# Patient Record
Sex: MALE | Race: WHITE
[De-identification: names, ages, dates, MRNs, and addresses within clinical notes are randomized per-mention and may not be internally consistent; named-entity substitution may affect disease eponyms.]

---

## 2023-04-21 ENCOUNTER — HOSPITAL ENCOUNTER (OUTPATIENT)
Age: 43
Discharge: HOME | End: 2023-04-21
Payer: COMMERCIAL

## 2023-04-21 DIAGNOSIS — K90.0: Primary | ICD-10-CM

## 2023-04-21 DIAGNOSIS — Z87.19: ICD-10-CM

## 2023-04-21 LAB
ALANINE AMINOTRANSFER ALT/SGPT: 29 U/L (ref 16–61)
ALBUMIN SERPL-MCNC: 4 G/DL (ref 3.2–5)
ALKALINE PHOSPHATASE: 49 U/L (ref 45–117)
ANION GAP: 3 (ref 5–15)
AST(SGOT): 13 U/L (ref 15–37)
BUN SERPL-MCNC: 21 MG/DL (ref 7–18)
BUN/CREAT RATIO: 19.6 RATIO (ref 10–20)
CALCIUM SERPL-MCNC: 9 MG/DL (ref 8.5–10.1)
CARBON DIOXIDE: 27 MMOL/L (ref 21–32)
CHLORIDE: 108 MMOL/L (ref 98–107)
CRP SERPL-MCNC: < 2.9 MG/L (ref 0–3)
DEPRECATED RDW RBC: 38.6 FL (ref 35.1–43.9)
ERYTHROCYTE [DISTWIDTH] IN BLOOD: 12.1 % (ref 11.6–14.6)
EST GLOM FILT RATE - AFR AMER: 98 ML/MIN (ref 60–?)
GLOBULIN: 3.1 G/DL (ref 2.2–4.2)
GLUCOSE: 94 MG/DL (ref 74–106)
HCT VFR BLD AUTO: 41 % (ref 40–54)
HEMOGLOBIN: 13.8 G/DL (ref 13–16.5)
HGB BLD-MCNC: 13.8 G/DL (ref 13–16.5)
IMMATURE GRANULOCYTES COUNT: 0.01 X10^3/UL (ref 0–0)
LDH: 168 U/L (ref 87–241)
LIPASE: 32 U/L (ref 13–75)
MCV RBC: 87.4 FL (ref 80–94)
MEAN CORP HGB CONC: 33.7 G/DL (ref 32–36)
MEAN PLATELET VOL.: 10.3 FL (ref 6.2–12)
NRBC FLAGGED BY ANALYZER: 0 % (ref 0–5)
PLATELET # BLD: 286 K/MM3 (ref 150–450)
PLATELET COUNT: 286 K/MM3 (ref 150–450)
POTASSIUM: 3.8 MMOL/L (ref 3.5–5.1)
RBC # BLD AUTO: 4.69 M/MM3 (ref 4.6–6.2)
RBC DISTRIBUTION WIDTH CV: 12.1 % (ref 11.6–14.6)
RBC DISTRIBUTION WIDTH SD: 38.6 FL (ref 35.1–43.9)
WBC # BLD AUTO: 5.9 K/MM3 (ref 4.4–11)
WHITE BLOOD COUNT: 5.9 K/MM3 (ref 4.4–11)

## 2023-04-21 PROCEDURE — 86334 IMMUNOFIX E-PHORESIS SERUM: CPT

## 2023-04-21 PROCEDURE — 85652 RBC SED RATE AUTOMATED: CPT

## 2023-04-21 PROCEDURE — 83516 IMMUNOASSAY NONANTIBODY: CPT

## 2023-04-21 PROCEDURE — 82785 ASSAY OF IGE: CPT

## 2023-04-21 PROCEDURE — 82787 IGG 1 2 3 OR 4 EACH: CPT

## 2023-04-21 PROCEDURE — 86256 FLUORESCENT ANTIBODY TITER: CPT

## 2023-04-21 PROCEDURE — 80053 COMPREHEN METABOLIC PANEL: CPT

## 2023-04-21 PROCEDURE — 84165 PROTEIN E-PHORESIS SERUM: CPT

## 2023-04-21 PROCEDURE — 83036 HEMOGLOBIN GLYCOSYLATED A1C: CPT

## 2023-04-21 PROCEDURE — 86140 C-REACTIVE PROTEIN: CPT

## 2023-04-21 PROCEDURE — 36415 COLL VENOUS BLD VENIPUNCTURE: CPT

## 2023-04-21 PROCEDURE — 83615 LACTATE (LD) (LDH) ENZYME: CPT

## 2023-04-21 PROCEDURE — 86225 DNA ANTIBODY NATIVE: CPT

## 2023-04-21 PROCEDURE — 82784 ASSAY IGA/IGD/IGG/IGM EACH: CPT

## 2023-04-21 PROCEDURE — 85025 COMPLETE CBC W/AUTO DIFF WBC: CPT

## 2023-04-21 PROCEDURE — 86235 NUCLEAR ANTIGEN ANTIBODY: CPT

## 2023-04-21 PROCEDURE — 83690 ASSAY OF LIPASE: CPT

## 2023-04-21 PROCEDURE — 86255 FLUORESCENT ANTIBODY SCREEN: CPT

## 2023-04-24 LAB
ANTI-CHROMATIN: <0.2 AI (ref 0–0.9)
ANTI-DSDNA  AB: 2 IU/ML (ref 0–9)
ANTI-JO: <0.2 AI (ref 0–0.9)
DSDNA AB SER-ACNC: 2 IU/ML (ref 0–9)
ENA JO1 AB SER-ACNC: <0.2 AI (ref 0–0.9)
IGA SER-ACNC: 136 MG/DL (ref 90–386)
IMMUNOGLOBULIN A: 136 MG/DL (ref 90–386)
SJOGREN'S ANTI-SS-A TEST: < 0.2 AI (ref 0–0.9)
SJOGREN'S ANTI-SS-B TEST: < 0.2 AI (ref 0–0.9)

## 2023-04-27 ENCOUNTER — HOSPITAL ENCOUNTER (OUTPATIENT)
Dept: HOSPITAL 100 - LABSPEC | Age: 43
Discharge: HOME | End: 2023-04-27
Payer: COMMERCIAL

## 2023-04-27 DIAGNOSIS — K90.0: Primary | ICD-10-CM

## 2023-04-27 PROCEDURE — 83630 LACTOFERRIN FECAL (QUAL): CPT

## 2023-04-27 PROCEDURE — 83993 ASSAY FOR CALPROTECTIN FECAL: CPT

## 2023-05-02 LAB — CALPROTECTIN, STOOL: 23 UG/G (ref 0–120)

## 2023-05-25 ENCOUNTER — HOSPITAL ENCOUNTER (OUTPATIENT)
Dept: HOSPITAL 100 - LAB | Age: 43
Discharge: HOME | End: 2023-05-25
Payer: COMMERCIAL

## 2023-05-25 DIAGNOSIS — K90.0: Primary | ICD-10-CM

## 2023-05-25 PROCEDURE — 82784 ASSAY IGA/IGD/IGG/IGM EACH: CPT

## 2023-05-25 PROCEDURE — 82787 IGG 1 2 3 OR 4 EACH: CPT

## 2023-05-25 PROCEDURE — 86256 FLUORESCENT ANTIBODY TITER: CPT

## 2023-05-25 PROCEDURE — 83516 IMMUNOASSAY NONANTIBODY: CPT

## 2023-05-25 PROCEDURE — 86334 IMMUNOFIX E-PHORESIS SERUM: CPT

## 2023-05-25 PROCEDURE — 84165 PROTEIN E-PHORESIS SERUM: CPT

## 2023-05-25 PROCEDURE — 82785 ASSAY OF IGE: CPT

## 2023-05-31 LAB
ACTIN IGG SERPL-ACNC: 8 UNITS (ref 0–19)
ALBUMIN SERPL-SCNC: 3.9 G/DL (ref 2.9–4.4)
ALBUMIN: 3.9 G/DL (ref 2.9–4.4)
ANTI-SMOOTH MUSCLE ABS: 8 UNITS (ref 0–19)
C-ANCA SER-ACNC: (no result) TITER
GAMMA GLOB SERPL ELPH-MCNC: 0.7 G/DL (ref 0.4–1.8)
GAMMA GLOBULIN: 0.7 G/DL (ref 0.4–1.8)
IGA SERPL-MCNC: 130 MG/DL (ref 90–386)
IGG SERPL-MCNC: 978 MG/DL
IGG SERPL-MCNC: 978 MG/DL (ref 603–1613)
IGG1 SER-MCNC: 313 MG/DL (ref 248–810)
IGG2 SER-MCNC: 548 MG/DL (ref 130–555)
IGG3 SER-MCNC: 51 MG/DL (ref 15–102)
IGG4 SER-MCNC: 86 MG/DL (ref 2–96)
IGM SERPL-MCNC: 48 MG/DL (ref 20–172)
IMMUNOGLOBULIN A: 130 MG/DL (ref 90–386)
IMMUNOGLOBULIN G: 978
IMMUNOGLOBULIN M: 48 MG/DL (ref 20–172)
P-ANCA TITR SER IF: (no result) TITER
PROEL- TOTAL PROTEIN: 6.4 G/DL (ref 6–8.5)
PROT SERPL-MCNC: 6.4 G/DL (ref 6–8.5)

## 2023-07-03 ENCOUNTER — HOSPITAL ENCOUNTER (OUTPATIENT)
Dept: HOSPITAL 100 - EN | Age: 43
Discharge: HOME | End: 2023-07-03
Payer: COMMERCIAL

## 2023-07-03 VITALS — SYSTOLIC BLOOD PRESSURE: 113 MMHG | DIASTOLIC BLOOD PRESSURE: 70 MMHG

## 2023-07-03 VITALS
TEMPERATURE: 97.16 F | DIASTOLIC BLOOD PRESSURE: 70 MMHG | SYSTOLIC BLOOD PRESSURE: 113 MMHG | RESPIRATION RATE: 16 BRPM | HEART RATE: 60 BPM | OXYGEN SATURATION: 100 %

## 2023-07-03 VITALS
TEMPERATURE: 97.2 F | DIASTOLIC BLOOD PRESSURE: 70 MMHG | OXYGEN SATURATION: 100 % | SYSTOLIC BLOOD PRESSURE: 96 MMHG | HEART RATE: 61 BPM | RESPIRATION RATE: 16 BRPM

## 2023-07-03 VITALS
RESPIRATION RATE: 16 BRPM | HEART RATE: 60 BPM | SYSTOLIC BLOOD PRESSURE: 119 MMHG | OXYGEN SATURATION: 100 % | DIASTOLIC BLOOD PRESSURE: 70 MMHG

## 2023-07-03 VITALS
DIASTOLIC BLOOD PRESSURE: 70 MMHG | SYSTOLIC BLOOD PRESSURE: 113 MMHG | RESPIRATION RATE: 18 BRPM | HEART RATE: 70 BPM | TEMPERATURE: 98.24 F | OXYGEN SATURATION: 100 %

## 2023-07-03 VITALS
HEART RATE: 60 BPM | OXYGEN SATURATION: 100 % | DIASTOLIC BLOOD PRESSURE: 72 MMHG | SYSTOLIC BLOOD PRESSURE: 123 MMHG | RESPIRATION RATE: 16 BRPM

## 2023-07-03 VITALS — BODY MASS INDEX: 23.8 KG/M2

## 2023-07-03 DIAGNOSIS — K29.70: ICD-10-CM

## 2023-07-03 DIAGNOSIS — K90.0: Primary | ICD-10-CM

## 2023-07-03 DIAGNOSIS — K31.89: ICD-10-CM

## 2023-07-03 DIAGNOSIS — Z87.19: ICD-10-CM

## 2023-07-03 PROCEDURE — 45380 COLONOSCOPY AND BIOPSY: CPT

## 2023-07-03 PROCEDURE — 43239 EGD BIOPSY SINGLE/MULTIPLE: CPT

## 2023-07-03 PROCEDURE — 0DJD8ZZ INSPECTION OF LOWER INTESTINAL TRACT, VIA NATURAL OR ARTIFICIAL OPENING ENDOSCOPIC: ICD-10-PCS | Performed by: INTERNAL MEDICINE

## 2023-07-03 PROCEDURE — 88342 IMHCHEM/IMCYTCHM 1ST ANTB: CPT

## 2023-07-03 PROCEDURE — 88305 TISSUE EXAM BY PATHOLOGIST: CPT

## 2024-01-23 ENCOUNTER — APPOINTMENT (OUTPATIENT)
Dept: RADIOLOGY | Facility: HOSPITAL | Age: 44
End: 2024-01-23
Payer: COMMERCIAL

## 2024-01-23 ENCOUNTER — HOSPITAL ENCOUNTER (EMERGENCY)
Facility: HOSPITAL | Age: 44
Discharge: HOME | End: 2024-01-23
Attending: EMERGENCY MEDICINE
Payer: COMMERCIAL

## 2024-01-23 VITALS
HEART RATE: 74 BPM | WEIGHT: 190 LBS | SYSTOLIC BLOOD PRESSURE: 94 MMHG | TEMPERATURE: 97.4 F | HEIGHT: 73 IN | DIASTOLIC BLOOD PRESSURE: 59 MMHG | BODY MASS INDEX: 25.18 KG/M2 | OXYGEN SATURATION: 100 % | RESPIRATION RATE: 18 BRPM

## 2024-01-23 DIAGNOSIS — S93.05XA: Primary | ICD-10-CM

## 2024-01-23 PROCEDURE — 27840 TREAT ANKLE DISLOCATION: CPT | Mod: LT

## 2024-01-23 PROCEDURE — 96372 THER/PROPH/DIAG INJ SC/IM: CPT

## 2024-01-23 PROCEDURE — 73610 X-RAY EXAM OF ANKLE: CPT | Mod: LEFT SIDE | Performed by: RADIOLOGY

## 2024-01-23 PROCEDURE — 2500000004 HC RX 250 GENERAL PHARMACY W/ HCPCS (ALT 636 FOR OP/ED)

## 2024-01-23 PROCEDURE — 2500000005 HC RX 250 GENERAL PHARMACY W/O HCPCS

## 2024-01-23 PROCEDURE — 99284 EMERGENCY DEPT VISIT MOD MDM: CPT | Mod: 25

## 2024-01-23 PROCEDURE — 29515 APPLICATION SHORT LEG SPLINT: CPT

## 2024-01-23 PROCEDURE — 99283 EMERGENCY DEPT VISIT LOW MDM: CPT

## 2024-01-23 PROCEDURE — 73610 X-RAY EXAM OF ANKLE: CPT | Mod: LT

## 2024-01-23 PROCEDURE — 99284 EMERGENCY DEPT VISIT MOD MDM: CPT | Performed by: EMERGENCY MEDICINE

## 2024-01-23 RX ORDER — MORPHINE SULFATE 4 MG/ML
4 INJECTION, SOLUTION INTRAMUSCULAR; INTRAVENOUS ONCE
Status: COMPLETED | OUTPATIENT
Start: 2024-01-23 | End: 2024-01-23

## 2024-01-23 RX ORDER — ONDANSETRON 8 MG/1
8 TABLET, ORALLY DISINTEGRATING ORAL ONCE
Status: COMPLETED | OUTPATIENT
Start: 2024-01-23 | End: 2024-01-23

## 2024-01-23 RX ORDER — ONDANSETRON 4 MG/1
TABLET, ORALLY DISINTEGRATING ORAL
Status: COMPLETED
Start: 2024-01-23 | End: 2024-01-23

## 2024-01-23 RX ORDER — MORPHINE SULFATE 4 MG/ML
INJECTION, SOLUTION INTRAMUSCULAR; INTRAVENOUS
Status: COMPLETED
Start: 2024-01-23 | End: 2024-01-23

## 2024-01-23 RX ORDER — HYDROCODONE BITARTRATE AND ACETAMINOPHEN 5; 325 MG/1; MG/1
1 TABLET ORAL EVERY 6 HOURS PRN
Qty: 12 TABLET | Refills: 0 | Status: SHIPPED | OUTPATIENT
Start: 2024-01-23 | End: 2024-01-26

## 2024-01-23 RX ADMIN — ONDANSETRON 8 MG: 8 TABLET, ORALLY DISINTEGRATING ORAL at 05:50

## 2024-01-23 RX ADMIN — MORPHINE SULFATE 4 MG: 4 INJECTION, SOLUTION INTRAMUSCULAR; INTRAVENOUS at 05:50

## 2024-01-23 RX ADMIN — ONDANSETRON 8 MG: 4 TABLET, ORALLY DISINTEGRATING ORAL at 05:50

## 2024-01-23 ASSESSMENT — PAIN DESCRIPTION - FREQUENCY: FREQUENCY: CONSTANT/CONTINUOUS

## 2024-01-23 ASSESSMENT — PAIN DESCRIPTION - PAIN TYPE: TYPE: ACUTE PAIN

## 2024-01-23 ASSESSMENT — PAIN SCALES - GENERAL: PAINLEVEL_OUTOF10: 5 - MODERATE PAIN

## 2024-01-23 ASSESSMENT — COLUMBIA-SUICIDE SEVERITY RATING SCALE - C-SSRS
6. HAVE YOU EVER DONE ANYTHING, STARTED TO DO ANYTHING, OR PREPARED TO DO ANYTHING TO END YOUR LIFE?: NO
1. IN THE PAST MONTH, HAVE YOU WISHED YOU WERE DEAD OR WISHED YOU COULD GO TO SLEEP AND NOT WAKE UP?: NO
2. HAVE YOU ACTUALLY HAD ANY THOUGHTS OF KILLING YOURSELF?: NO

## 2024-01-23 ASSESSMENT — PAIN DESCRIPTION - ORIENTATION: ORIENTATION: LEFT

## 2024-01-23 ASSESSMENT — PAIN - FUNCTIONAL ASSESSMENT: PAIN_FUNCTIONAL_ASSESSMENT: 0-10

## 2024-01-23 ASSESSMENT — PAIN DESCRIPTION - LOCATION: LOCATION: ANKLE

## 2024-01-23 NOTE — ED PROVIDER NOTES
Left ankle injury.  This 43-year-old white male presents to the ED due to an injury to his left ankle that occurred around 5:10 AM this morning.  Patient states that he was starting his car when he slipped on ice and landed with his left ankle underneath him in a weird position.  He states that he has not been able to bear weight since then and notes an obvious deformity to his left ankle he also admits to an abrasion to his left hand.  Denies striking his head or having loss of consciousness and denies any numbness tingling weakness or loss of function.  He states that any type of movement of his ankle causes increased pain rest helps to some extent.      History provided by:  Patient and spouse   used: No         Physical Exam  Vitals and nursing note reviewed.   Constitutional:       Appearance: Normal appearance. He is normal weight.   HENT:      Head: Normocephalic and atraumatic.      Right Ear: Hearing and external ear normal.      Left Ear: Hearing and external ear normal.      Nose: Nose normal. No congestion or rhinorrhea.      Mouth/Throat:      Mouth: Mucous membranes are moist.      Pharynx: Oropharynx is clear. No oropharyngeal exudate or posterior oropharyngeal erythema.   Eyes:      General: Lids are normal.         Right eye: No discharge.         Left eye: No discharge.      Extraocular Movements: Extraocular movements intact.      Conjunctiva/sclera: Conjunctivae normal.      Pupils: Pupils are equal, round, and reactive to light.   Neck:      Trachea: Trachea and phonation normal.      Comments: There is no midline tenderness crepitus or step-off noted.  Cardiovascular:      Rate and Rhythm: Normal rate and regular rhythm.      Pulses: Normal pulses.      Heart sounds: Normal heart sounds. No murmur heard.     No friction rub. No gallop.   Pulmonary:      Effort: Pulmonary effort is normal. No respiratory distress.      Breath sounds: Normal breath sounds. No stridor. No  wheezing, rhonchi or rales.   Chest:      Chest wall: No tenderness.   Abdominal:      General: Abdomen is flat. Bowel sounds are normal. There is no distension.      Palpations: Abdomen is soft. There is no mass.      Tenderness: There is no abdominal tenderness. There is no guarding or rebound.      Hernia: No hernia is present.   Musculoskeletal:         General: No swelling or signs of injury.      Cervical back: Full passive range of motion without pain, normal range of motion and neck supple.      Right lower leg: No edema.      Left lower leg: No edema.      Right ankle: Normal.      Left ankle: Deformity present. Tenderness present over the medial malleolus. Decreased range of motion.      Comments: Evaluation of the left lower extremity revealed distal pulses are +2/4 and present at the dorsalis pedis and tibialis.  Cap refill is less than 2 seconds.  Light touch sensations intact.  Patient has an obvious posterior dislocation at the ankle.   Skin:     General: Skin is warm and dry.      Capillary Refill: Capillary refill takes less than 2 seconds.      Coloration: Skin is not jaundiced or pale.      Findings: Abrasion present. No bruising, erythema, lesion or rash.      Comments: Patient has a superficial abrasion at the hypothenar eminence palmar surface of left hand.  Patient has no focal bony tenderness in this area.   Neurological:      General: No focal deficit present.      Mental Status: He is alert and oriented to person, place, and time.      GCS: GCS eye subscore is 4. GCS verbal subscore is 5. GCS motor subscore is 6.      Cranial Nerves: Cranial nerves 2-12 are intact. No cranial nerve deficit.      Sensory: Sensation is intact. No sensory deficit.      Motor: Motor function is intact. No weakness.      Coordination: Coordination is intact. Coordination normal.      Deep Tendon Reflexes: Reflexes normal.   Psychiatric:         Attention and Perception: Attention and perception normal.          Mood and Affect: Mood and affect normal.         Speech: Speech normal.         Behavior: Behavior normal. Behavior is cooperative.         Thought Content: Thought content normal.         Cognition and Memory: Cognition and memory normal.         Judgment: Judgment normal.          Labs Reviewed - No data to display     XR ankle left 3+ views   Final Result   No fracture of the left ankle.   Signed by Rickey Headley MD           Procedures     Medical Decision Making  Because of the the obvious deformity of the left ankle I performed a closed reduction and.  And staff member stabilized the distal tib-fib and applied anterior force to the foot with reduction of the posterior dislocation.  After I let go of the foot the patient's ankle attempted to go back into a position of subluxation dislocation consistent with a ligamentous injuries.  It was decided at that point time to place the patient in a splint to stabilize the ankle before getting x-rays.  Nursing staff assisted in application of a posterior sugar-tong splint to the ankle which the patient tolerated.  The patient was treated with IM morphine and p.o. Zofran to help with his discomfort.  Patient's pulses after reduction of the ankle were +2/4 and present at the dorsalis pedis and tibialis.  Cap refill less than 2 seconds.  Patient tolerated application of the splint and x-rays of the ankle were ordered and pending.         Diagnoses as of 01/23/24 2013   Dislocation of ankle joint, left, initial encounter                    Chandu Smith,   01/23/24 2013

## 2024-03-07 ENCOUNTER — EVALUATION (OUTPATIENT)
Dept: PHYSICAL THERAPY | Facility: CLINIC | Age: 44
End: 2024-03-07
Payer: COMMERCIAL

## 2024-03-07 DIAGNOSIS — S93.05XA DISLOCATION OF LEFT ANKLE JOINT, INITIAL ENCOUNTER: ICD-10-CM

## 2024-03-07 DIAGNOSIS — S93.05XD: Primary | ICD-10-CM

## 2024-03-07 PROCEDURE — 97161 PT EVAL LOW COMPLEX 20 MIN: CPT | Mod: GP

## 2024-03-07 PROCEDURE — 97110 THERAPEUTIC EXERCISES: CPT | Mod: GP

## 2024-03-07 ASSESSMENT — ENCOUNTER SYMPTOMS
DEPRESSION: 0
OCCASIONAL FEELINGS OF UNSTEADINESS: 1
LOSS OF SENSATION IN FEET: 0

## 2024-03-07 ASSESSMENT — PAIN SCALES - GENERAL: PAINLEVEL_OUTOF10: 5 - MODERATE PAIN

## 2024-03-07 ASSESSMENT — ACTIVITIES OF DAILY LIVING (ADL): EFFECT OF PAIN ON DAILY ACTIVITIES: SEE GOALS

## 2024-03-07 ASSESSMENT — PAIN - FUNCTIONAL ASSESSMENT: PAIN_FUNCTIONAL_ASSESSMENT: 0-10

## 2024-03-07 NOTE — PROGRESS NOTES
Physical Therapy Evaluation and Treatment      Patient Name: Hernesto Funes  MRN: [id number]  Today's Date: 3/7/2024  Time Calculation  Start Time: 0751  Stop Time: 0830  Time Calculation (min): 39 min    PT Evaluation Time Entry  PT Evaluation (Low) Time Entry: 30   PT Therapeutic Procedures Time Entry  Therapeutic Exercise Time Entry: 9                         Assessment:  Rehab Prognosis: Good  Barriers to Participation:  (none)  C/C sx: see pain section  YUAN:    see pain section  ADL makes worse?:WBing  ADL makes better?:NWBing  Testing:    Physical Findings: Limited: AROM ( ankle )                                      Strength ( ankle )                                                  POC:  Ongoing clinic PT to include: The patient is not aware of any ADL limits.  He was advised to contact the Drs office RE brace wear guidelines (he entered clinic today without his brace).   Continue with open to closed chain ROM/PRE and gait/balance work within any limitations per patient per DR.    Other: (copay?-no  ) (fall risk?- low  )   Plan:  Treatment/Interventions: Aquatic therapy, Cryotherapy, Education/ Instruction, Electrical stimulation, Gait training, Hot pack, Manual therapy, Neuromuscular re-education, Self care/ home management, Therapeutic exercises (IASTM/cupping)  PT Plan: Skilled PT  PT Frequency: 2 times per week  Duration: 6wks  Onset Date: 01/23/24  Rehab Potential: Good  Plan of Care Agreement: Patient    Current Problem:   1. Dislocation of left ankle joint, initial encounter  Referral to Physical Therapy          Subjective    General:  General  Reason for Referral: S/P closed ankle disloc  Referred By: Chris  General Comment: 1/13  Precautions:  Precautions  BRITTANY Fall Risk Score (The score of 4 or more indicates an increased risk of falling): 5  Braces Applied: low leg walkng boot from Dr Degroot Comment: none    Pain:  Pain Assessment  Pain Assessment: 0-10  Pain Score: 5 - Moderate  "pain  Pain Location: Ankle  Pain Orientation: Left  Effect of Pain on Daily Activities: see goals  Pain Interventions:  (walking boot given-the patient is not fully aware of wearing guidelines)    Prior Level of Function:  Prior Function Per Pt/Caregiver Report  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  He was advised to contact the Drs office RE brace wear guidelines (he entered clinic today without his brace).   Continue with open to closed chain ROM/PRE and gait/balance work within any limitations per patient per .  DYLLAN EDUCATION:  Access Code: [email or web address]  URL: [email or web address]  Date: 03/07/2024  Prepared by: Jenaro Rand    Exercises  - Supine Single Leg Ankle Pumps (Mirrored)   - Supine Ankle Inversion Eversion AROM (Mirrored)   - Seated Calf Stretch with Strap (Mirrored)   - Long Sitting Ankle Dorsiflexion with Anchored Resistance (Mirrored)     Goals:  Active       PT Problem       PT Goal 1       Start:  03/07/24    Expected End:  06/05/24       1. Independent HEP to allow for 50% reduction in max ADL C/C sx ( 5/10) 2-3wk  2. 0/10 night time sx to allow for uninterrupted sleep x 1wk ( 7/7) 2-3wks  3. Survey score improvement from 38/80 to 45/80 (LEFS) 4-6wks  4. ROM increase to allow for improved ADL gait (from neut-10 active DF) 4-6wks  5. Strength increase to allow for improved ADL stairs (from 4-/5 to 4+/5 L ankle) 4-6wks         PT Goal 2       Start:  03/07/24    Expected End:  06/05/24       1.\"I'm looking to get back to work\".             ANKLE        Ankle Observation  Observation Comment: reduced stance LLE; LLE toe-out; reduced push-off LLE (entered without walking brace)      Ankle AROM WFL unless documented below  R " ankle dorsiflexion: (10°): 10  L ankle dorsiflexion: (10°): neut  R ankle plantarflexion: (40°): 40  L ankle plantarflexion: (40°): 20  R ankle inversion: (30°): 20  L ankle inversion: (30°): 5  R ankle eversion: (20°): 20  L ankle eversion: (20°): 5      Lower Extremity Strength:WFL unless documented  MMT 5/5 max  RIGHT LEFT                       Knee Extension   5/5   5/5   Knee Flexion   5/5   5/5   Ankle DF   5/5  4- /5   Ankle PF   5/5  4- /5   Ankle INV   5/5   4-/5   Ankle EV   5/5   4-/5

## 2024-03-11 ENCOUNTER — TREATMENT (OUTPATIENT)
Dept: PHYSICAL THERAPY | Facility: CLINIC | Age: 44
End: 2024-03-11
Payer: COMMERCIAL

## 2024-03-11 DIAGNOSIS — S93.05XD: ICD-10-CM

## 2024-03-11 PROCEDURE — 97140 MANUAL THERAPY 1/> REGIONS: CPT | Mod: GP,CQ | Performed by: PHYSICAL THERAPY ASSISTANT

## 2024-03-11 PROCEDURE — 97110 THERAPEUTIC EXERCISES: CPT | Mod: GP,CQ | Performed by: PHYSICAL THERAPY ASSISTANT

## 2024-03-11 ASSESSMENT — ACTIVITIES OF DAILY LIVING (ADL): EFFECT OF PAIN ON DAILY ACTIVITIES: SEE GOALS

## 2024-03-11 ASSESSMENT — PAIN - FUNCTIONAL ASSESSMENT: PAIN_FUNCTIONAL_ASSESSMENT: 0-10

## 2024-03-11 ASSESSMENT — PAIN SCALES - GENERAL: PAINLEVEL_OUTOF10: 3

## 2024-03-11 NOTE — PROGRESS NOTES
Physical Therapy    Physical Therapy Treatment    Patient Name: Hernesto Funes  MRN: [id number]  Today's Date: 3/11/2024  Time Calculation  Start Time: 1215  Stop Time: 1300  Time Calculation (min): 45 min  PT Therapeutic Procedures Time Entry  Manual Therapy Time Entry: 12  Therapeutic Exercise Time Entry: 28                   Current Problem  Problem List Items Addressed This Visit             ICD-10-CM       Other    Dislocation of ankle, left, closed, subsequent encounter S93.05XD        General  Reason for Referral: S/P closed ankle disloc  Referred By: Chris  General Comment:     Subjective   Patient reports 3/10 left ankle pain. Reports wearing walking boot when out but came to therapy without wearing it.  States thats because I was only coming here.  Patient reports resting/icing left ankle as needed.  Therapist sanitized hands pre/post treatment.    Precautions  Precautions  Braces Applied: low leg walkng boot from Dr Degroot Comment: none    Pain  Pain Assessment: 0-10  Pain Score: 3  Pain Location: Ankle  Pain Orientation: Left  Effect of Pain on Daily Activities: see goals    Objective   BAPS L2 DF/PF/IN/EV x 10 reps each  Alphabet writing 1 cycle  DF/PF/IN/EV  with green Tband x 20 ea. Dir.  Slant board x 2 for 1 min. Ea.  Standing squats x 20  Standing heel/toe raises x 20   Upright Bike  A    STW to left ankle region while working ROM. 12 min.      Treatments:    OP Education  Recommend using anagelsic when on left LE and to wear walking boot.  Icing as needed/elevating to reduce edema.      Assessment  Patient tolerated treatment without increased pain, states stiffness in left ankle.  Continue with left ankle strength/ROM/STW to decrease pain and edema, improve ambulation to RTW.  Patient verified by name and .    Plan  Continue with left ankle ROM/strength to improve ambulation, standing, sleeping.  Treatment/Interventions: Aquatic therapy, Cryotherapy, Education/ Instruction,  "Electrical stimulation, Gait training, Hot pack, Manual therapy, Neuromuscular re-education, Self care/ home management, Therapeutic exercises (IASTM/cupping)  PT Plan: Skilled PT  PT Frequency: 2 times per week  Duration: 6wks  Onset Date: 01/23/24  Rehab Potential: Good  Plan of Care Agreement: Patient    Active       PT Problem       PT Goal 1       Start:  03/07/24    Expected End:  06/05/24       1. Independent HEP to allow for 50% reduction in max ADL C/C sx ( 5/10) 2-3wk  2. 0/10 night time sx to allow for uninterrupted sleep x 1wk ( 7/7) 2-3wks  3. Survey score improvement from 38/80 to 45/80 (LEFS) 4-6wks  4. ROM increase to allow for improved ADL gait (from neut-10 active DF) 4-6wks  5. Strength increase to allow for improved ADL stairs (from 4-/5 to 4+/5 L ankle) 4-6wks         PT Goal 2       Start:  03/07/24    Expected End:  06/05/24       1.\"I'm looking to get back to work\".             "

## 2024-03-14 ENCOUNTER — TREATMENT (OUTPATIENT)
Dept: PHYSICAL THERAPY | Facility: CLINIC | Age: 44
End: 2024-03-14
Payer: COMMERCIAL

## 2024-03-14 ENCOUNTER — OFFICE VISIT (OUTPATIENT)
Dept: PRIMARY CARE | Facility: CLINIC | Age: 44
End: 2024-03-14
Payer: COMMERCIAL

## 2024-03-14 VITALS
SYSTOLIC BLOOD PRESSURE: 122 MMHG | HEIGHT: 73 IN | DIASTOLIC BLOOD PRESSURE: 64 MMHG | HEART RATE: 97 BPM | WEIGHT: 196.37 LBS | BODY MASS INDEX: 26.02 KG/M2 | OXYGEN SATURATION: 98 %

## 2024-03-14 DIAGNOSIS — Z00.00 ROUTINE GENERAL MEDICAL EXAMINATION AT A HEALTH CARE FACILITY: Primary | ICD-10-CM

## 2024-03-14 DIAGNOSIS — S93.05XD: ICD-10-CM

## 2024-03-14 PROCEDURE — 97140 MANUAL THERAPY 1/> REGIONS: CPT | Mod: GP,CQ

## 2024-03-14 PROCEDURE — 99396 PREV VISIT EST AGE 40-64: CPT | Performed by: FAMILY MEDICINE

## 2024-03-14 PROCEDURE — 97110 THERAPEUTIC EXERCISES: CPT | Mod: GP,CQ

## 2024-03-14 PROCEDURE — 1036F TOBACCO NON-USER: CPT | Performed by: FAMILY MEDICINE

## 2024-03-14 RX ORDER — ESCITALOPRAM OXALATE 5 MG/1
5 TABLET ORAL DAILY
Qty: 30 TABLET | Refills: 11 | Status: SHIPPED | OUTPATIENT
Start: 2024-03-14 | End: 2025-03-14

## 2024-03-14 ASSESSMENT — ENCOUNTER SYMPTOMS
NERVOUS/ANXIOUS: 1
SHORTNESS OF BREATH: 0
PALPITATIONS: 0
SLEEP DISTURBANCE: 1
HEADACHES: 0
CONSTIPATION: 0
FATIGUE: 1
DIARRHEA: 0
NAUSEA: 0

## 2024-03-14 ASSESSMENT — PAIN - FUNCTIONAL ASSESSMENT: PAIN_FUNCTIONAL_ASSESSMENT: 0-10

## 2024-03-14 ASSESSMENT — PATIENT HEALTH QUESTIONNAIRE - PHQ9
SUM OF ALL RESPONSES TO PHQ9 QUESTIONS 1 AND 2: 0
2. FEELING DOWN, DEPRESSED OR HOPELESS: NOT AT ALL
1. LITTLE INTEREST OR PLEASURE IN DOING THINGS: NOT AT ALL

## 2024-03-14 ASSESSMENT — PAIN SCALES - GENERAL: PAINLEVEL_OUTOF10: 3

## 2024-03-14 NOTE — PROGRESS NOTES
"Physical Therapy Treatment    Patient Name: Hernesto Funes  MRN: [id number]  Today's Date: 3/14/2024  Time Calculation  Start Time: 1000  Stop Time: 1044  Time Calculation (min): 44 min    PT Therapeutic Procedures Time Entry  Manual Therapy Time Entry: 8  Therapeutic Exercise Time Entry: 32         Subjective  Patient reports  100% compliance with HEP and expressed good understanding. Patient states that his pain levels are about the same. States his pain is at the top of foot and medial ankle. No increased pain at end of session.           Precautions  Precautions  Braces Applied: low leg walkng boot from Dr Degroot Comment: none  Pain  Pain Assessment: 0-10  Pain Score: 3  Pain Location: Ankle  Pain Orientation: Left    Assessment: Patient identified by name and date of birth. Patient walks with antalgic gait into clinic, no boot donned. Patient donned with community ambulation. Added SLS with patient demonstrating fair balance reactions, 1-2 UE intermittent support noted. HEP given with good understanding expressed.    PT Assessment  Rehab Prognosis: Good    Plan:  OP PT Plan  Treatment/Interventions: Aquatic therapy, Cryotherapy, Education/ Instruction, Electrical stimulation, Gait training, Hot pack, Manual therapy, Neuromuscular re-education, Self care/ home management, Therapeutic exercises (IASTM/cupping)  PT Plan: Skilled PT  PT Frequency: 2 times per week  Duration: 6wks  Onset Date: 01/23/24  Rehab Potential: Good  Plan of Care Agreement: Patient    Current Problem  1. Dislocation of ankle, left, closed, subsequent encounter  Follow Up In Physical Therapy          Reason for Referral: S/P closed ankle disloc  Referred By: Chris  General Comment: 2/13    Treatments:  TE: 32'  Upright Bike  5'   Side Step in // bars 2 laps N  Step ups 2x10 6\" N   SLS 3 x 30\" N  Slant board x 2 for 1 min. Ea.  Standing squats x 20  Standing heel/toe raises x 20   BAPS L2 DF/PF/IN/EV x 10 reps each  Alphabet writing 1 " "cycle  DF/PF/IN/EV  with green Tband x 20 ea. Dir.          Manual:      STW to left ankle region while working ROM.  8 min.        OP EDUCATION:  Outpatient Education  Individual(s) Educated: Patient  Patient/Caregiver Demonstrated Understanding: yes  Plan of Care Discussed and Agreed Upon: yesAccess Code: [email or web address]  URL: [email or web address]  Date: 03/07/2024  Prepared by: Jenaro Rand     Exercises  - Supine Single Leg Ankle Pumps (Mirrored)   - Supine Ankle Inversion Eversion AROM (Mirrored)   - Seated Calf Stretch with Strap (Mirrored)   - Long Sitting Ankle Dorsiflexion with Anchored Resistance (Mirrored)   - Standing Heel Raise with Support  - 1 x daily - 7 x weekly - 3 sets - 10 reps  - Standing Toe Raises at Chair  - 1 x daily - 7 x weekly - 3 sets - 10 reps  - Mini Squat  - 1 x daily - 7 x weekly - 3 sets - 10 reps  - Single Leg Stance with Support  - 1 x daily - 7 x weekly - 3 reps - 30 hold  - Step Up  - 1 x daily - 7 x weekly - 3 sets - 10 reps    Goals:  Active       PT Problem       PT Goal 1       Start:  03/07/24    Expected End:  06/05/24       1. Independent HEP to allow for 50% reduction in max ADL C/C sx ( 5/10) 2-3wk  2. 0/10 night time sx to allow for uninterrupted sleep x 1wk ( 7/7) 2-3wks  3. Survey score improvement from 38/80 to 45/80 (LEFS) 4-6wks  4. ROM increase to allow for improved ADL gait (from neut-10 active DF) 4-6wks  5. Strength increase to allow for improved ADL stairs (from 4-/5 to 4+/5 L ankle) 4-6wks         PT Goal 2       Start:  03/07/24    Expected End:  06/05/24       1.\"I'm looking to get back to work\".            "

## 2024-03-14 NOTE — PROGRESS NOTES
"Subjective   Patient ID: Hernesto Funes is a 43 y.o. male who presents for Annual Exam (INSOMNIA AND ANXIETY).    HPI   Had labs done in [place] for 2023 CBC CMP were okay.  Daughter is being bullied at school has had some long standing anxiety and it has been at least 5 or 6 years and has not slept well.  Tried Tylenol PM over-the-counter did not help.    At this point I think it is more of an anxiety issue than a pure insomnia issue.  But to try to stay away from sleep medicines if possible.  Start Lexapro 5 mg daily.  If things are improved and no troubles in 4 to 6 weeks, call we can increase to 10 mg.  He knows that if he needs help beyond that he will make an office visit.    Review of Systems   Constitutional:  Positive for fatigue.   Respiratory:  Negative for shortness of breath.    Cardiovascular:  Negative for chest pain and palpitations.   Gastrointestinal:  Negative for constipation, diarrhea and nausea.   Neurological:  Negative for headaches.   Psychiatric/Behavioral:  Positive for sleep disturbance. The patient is nervous/anxious.        Objective   /64   Pulse 97   Ht 1.854 m (6' 1\")   Wt 89.1 kg (196 lb 5.9 oz)   SpO2 98%   BMI 25.91 kg/m²     Physical Exam  Constitutional:       Appearance: Normal appearance.   HENT:      Head: Normocephalic and atraumatic.   Cardiovascular:      Rate and Rhythm: Normal rate and regular rhythm.      Heart sounds: Normal heart sounds.   Pulmonary:      Effort: Pulmonary effort is normal.      Breath sounds: Normal breath sounds.   Skin:     General: Skin is warm and dry.   Neurological:      General: No focal deficit present.      Mental Status: He is alert and oriented to person, place, and time.   Psychiatric:         Mood and Affect: Mood normal.         Behavior: Behavior normal.         Thought Content: Thought content normal.         Judgment: Judgment normal.         Assessment/Plan   Problem List Items Addressed This Visit    None  Visit " Diagnoses         Codes    Routine general medical examination at a health care facility    -  Primary Z00.00    Relevant Medications    escitalopram (Lexapro) 5 mg tablet

## 2024-03-18 ENCOUNTER — TREATMENT (OUTPATIENT)
Dept: PHYSICAL THERAPY | Facility: CLINIC | Age: 44
End: 2024-03-18
Payer: COMMERCIAL

## 2024-03-18 DIAGNOSIS — S93.05XD: ICD-10-CM

## 2024-03-18 PROCEDURE — 97110 THERAPEUTIC EXERCISES: CPT | Mod: GP,CQ

## 2024-03-18 PROCEDURE — 97140 MANUAL THERAPY 1/> REGIONS: CPT | Mod: GP,CQ

## 2024-03-18 ASSESSMENT — PAIN - FUNCTIONAL ASSESSMENT: PAIN_FUNCTIONAL_ASSESSMENT: 0-10

## 2024-03-18 ASSESSMENT — PAIN SCALES - GENERAL: PAINLEVEL_OUTOF10: 1

## 2024-03-18 NOTE — PROGRESS NOTES
"Physical Therapy Treatment    Patient Name: Hernesto Funes  MRN: [id number]  Today's Date: 3/18/2024  Time Calculation  Start Time: 1045  Stop Time: 1126  Time Calculation (min): 41 min  PT Therapeutic Procedures Time Entry  Manual Therapy Time Entry: 8  Therapeutic Exercise Time Entry: 30         Current Problem  Problem List Items Addressed This Visit             ICD-10-CM    Dislocation of ankle, left, closed, subsequent encounter S93.05XD       Subjective   Pt.'s name and birthday confirmed.  Pt. Is compliant with HEP.  Pt. Reports decreased pain since previous visit.  No reports of falls.    Reason for Referral: S/P closed ankle disloc  Referred By: Chris  General Comment: 3/13      Precautions  Precautions  Braces Applied: low leg walkng boot from Dr Degroot Comment: none      Pain  Pain Assessment: 0-10  Pain Score: 1  Pain Location: Ankle  Pain Orientation: Left      Treatments:  TE: 30'  Upright Bike 6'   Side Step in // bars 2 laps  Step ups 2x10 6\"  SLS 3 x 30\"  Slant board x 2 for 1 min. Ea.  Standing squats x 20  Standing heel/toe raises x 20   Standing small balance board x2  30\" ea (N)  BAPS L2 DF/PF/IN/EV x 10 reps each   -CW/CCW x5 reps each (N)  Alphabet writing 1 cycle  DF/PF/IN/EV  with green Tband x 20 ea. Dir.           Manual: 8 mins  STW to left ankle region while working ROM.  8 min.         Assessment:  Discomfort noted with DF and eversion.  C/o palpable tenderness with top of foot with STM.  Fair tolerance with TE with rest breaks needed d/t fatigue.       Plan:  Continue with strengthening, ROM and flexibility per tolerance to improve daily activities with greater ease.  MB    OP PT Plan  Treatment/Interventions: Aquatic therapy, Cryotherapy, Education/ Instruction, Electrical stimulation, Gait training, Hot pack, Manual therapy, Neuromuscular re-education, Self care/ home management, Therapeutic exercises (IASTM/cupping)  PT Plan: Skilled PT  PT Frequency: 2 times per " "week  Duration: 6wks  Onset Date: 01/23/24  Rehab Potential: Good  Plan of Care Agreement: Patient        OP EDUCATION:   Outpatient Education  Individual(s) Educated: Patient  Patient/Caregiver Demonstrated Understanding: yes  Plan of Care Discussed and Agreed Upon: yesAccess Code: [email or web address]  URL: [email or web address]  Date: 03/07/2024  Prepared by: Jenaro Rand     Exercises  - Supine Single Leg Ankle Pumps (Mirrored)   - Supine Ankle Inversion Eversion AROM (Mirrored)   - Seated Calf Stretch with Strap (Mirrored)   - Long Sitting Ankle Dorsiflexion with Anchored Resistance (Mirrored)   - Standing Heel Raise with Support  - 1 x daily - 7 x weekly - 3 sets - 10 reps  - Standing Toe Raises at Chair  - 1 x daily - 7 x weekly - 3 sets - 10 reps  - Mini Squat  - 1 x daily - 7 x weekly - 3 sets - 10 reps  - Single Leg Stance with Support  - 1 x daily - 7 x weekly - 3 reps - 30 hold  - Step Up  - 1 x daily - 7 x weekly - 3 sets - 10 reps         Goals:  Active       PT Problem       PT Goal 1       Start:  03/07/24    Expected End:  06/05/24       1. Independent HEP to allow for 50% reduction in max ADL C/C sx ( 5/10) 2-3wk  2. 0/10 night time sx to allow for uninterrupted sleep x 1wk ( 7/7) 2-3wks  3. Survey score improvement from 38/80 to 45/80 (LEFS) 4-6wks  4. ROM increase to allow for improved ADL gait (from neut-10 active DF) 4-6wks  5. Strength increase to allow for improved ADL stairs (from 4-/5 to 4+/5 L ankle) 4-6wks         PT Goal 2       Start:  03/07/24    Expected End:  06/05/24       1.\"I'm looking to get back to work\".            "

## 2024-03-21 ENCOUNTER — TREATMENT (OUTPATIENT)
Dept: PHYSICAL THERAPY | Facility: CLINIC | Age: 44
End: 2024-03-21
Payer: COMMERCIAL

## 2024-03-21 DIAGNOSIS — S93.05XD: ICD-10-CM

## 2024-03-21 PROCEDURE — 97110 THERAPEUTIC EXERCISES: CPT | Mod: GP,CQ

## 2024-03-21 ASSESSMENT — PAIN - FUNCTIONAL ASSESSMENT: PAIN_FUNCTIONAL_ASSESSMENT: 0-10

## 2024-03-21 ASSESSMENT — PAIN SCALES - GENERAL: PAINLEVEL_OUTOF10: 1

## 2024-03-21 NOTE — PROGRESS NOTES
"Physical Therapy Treatment    Patient Name: Hernesto Funes  MRN: [id number]  Today's Date: 3/21/2024  Time Calculation  Start Time: 0950  Stop Time: 1041  Time Calculation (min): 51 min    PT Therapeutic Procedures Time Entry  Manual Therapy Time Entry: 5  Therapeutic Exercise Time Entry: 40         Subjective  Patient reports 100% compliance with HEP and expressed good understanding. Patient report  Gave him 2 weeks. States that his pain is decreased when first gets out of bed, it starts hurting as the day progresses. Slight increase in pain at end of session.         Precautions  Precautions  Braces Applied: low leg walkng boot from Dr Degroot Comment: none  Pain  Pain Assessment: 0-10  Pain Score: 1  Pain Location: Ankle  Pain Orientation: Left    Assessment: Patient identified by name and date of birth. Patient continues with antalgic gait, cues for heel strike and toe off. Progress step ups to 12\" to simulate stepping up into equipment, mild difficulty and but no increased pain. Advanced to squats on shuttle, with patient using light 2 UE support. Patient able to advance to heel clocks, performed in small range.         Plan: Plan to continue with L ankle strengthening and proprioception to allow for ambulation on uneven surfaces with return to work. JW    OP PT Plan  Treatment/Interventions: Aquatic therapy, Cryotherapy, Education/ Instruction, Electrical stimulation, Gait training, Hot pack, Manual therapy, Neuromuscular re-education, Self care/ home management, Therapeutic exercises (IASTM/cupping)  PT Plan: Skilled PT  PT Frequency: 2 times per week  Duration: 6wks  Onset Date: 01/23/24  Rehab Potential: Good  Plan of Care Agreement: Patient    Current Problem  1. Dislocation of ankle, left, closed, subsequent encounter  Follow Up In Physical Therapy          Reason for Referral: S/P closed ankle disloc  Referred By: Chris  General Comment: 4/13    Treatments:   TE: 40'  Upright Bike 6' Slant " "board  Slant board 2 x 1'    SLS 3 x 30\" Airex (P)  Slant board x 2 for 1 min. Ea.  Standing squats x 20 on shuttle P  Standing heel/toe raises x 20   Heel Clocks standing on L 2x10 (N)  Step ups 18\" (P)  Shuttle leg press 75# b/l, 25# L 2x10 (N)  Lunges on Bosu 2x10 L (N)  Standing small balance board x2  1' ea (P)  BAPS L2 DF/PF/IN/EV x 10 reps each              -CW/CCW x5 reps each   DF/PF/IN/EV  with /Blue Tband x 20 ea. Dir. (P)      Alphabet writing 1 cycle HEP     Manual: 5'   STW to left ankle region while working ROM.  8 min.      Education:     Individual(s) Educated: Patient  Patient/Caregiver Demonstrated Understanding: yes  Plan of Care Discussed and Agreed Upon: yesAccess Code: [email or web address]  URL: [email or web address]  Date: 03/07/2024  Prepared by: Jenaro Rand     Exercises  - Supine Single Leg Ankle Pumps (Mirrored)   - Supine Ankle Inversion Eversion AROM (Mirrored)   - Seated Calf Stretch with Strap (Mirrored)   - Long Sitting Ankle Dorsiflexion with Anchored Resistance (Mirrored)   - Standing Heel Raise with Support  - 1 x daily - 7 x weekly - 3 sets - 10 reps  - Standing Toe Raises at Chair  - 1 x daily - 7 x weekly - 3 sets - 10 reps  - Mini Squat  - 1 x daily - 7 x weekly - 3 sets - 10 reps  - Single Leg Stance with Support  - 1 x daily - 7 x weekly - 3 reps - 30 hold  - Step Up  - 1 x daily - 7 x weekly - 3 sets - 10 reps          Goals:  Active       PT Problem       PT Goal 1       Start:  03/07/24    Expected End:  06/05/24       1. Independent HEP to allow for 50% reduction in max ADL C/C sx ( 5/10) 2-3wk  2. 0/10 night time sx to allow for uninterrupted sleep x 1wk ( 7/7) 2-3wks  3. Survey score improvement from 38/80 to 45/80 (LEFS) 4-6wks  4. ROM increase to allow for improved ADL gait (from neut-10 active DF) 4-6wks  5. Strength increase to allow for improved ADL stairs (from 4-/5 to 4+/5 L ankle) 4-6wks         PT Goal 2       Start:  03/07/24    Expected End:  " "06/05/24       1.\"I'm looking to get back to work\".            "

## 2024-03-28 ENCOUNTER — TREATMENT (OUTPATIENT)
Dept: PHYSICAL THERAPY | Facility: CLINIC | Age: 44
End: 2024-03-28
Payer: COMMERCIAL

## 2024-03-28 DIAGNOSIS — S93.05XD: ICD-10-CM

## 2024-03-28 PROCEDURE — 97110 THERAPEUTIC EXERCISES: CPT | Mod: GP,CQ

## 2024-03-28 ASSESSMENT — PAIN - FUNCTIONAL ASSESSMENT: PAIN_FUNCTIONAL_ASSESSMENT: 0-10

## 2024-03-28 ASSESSMENT — PAIN SCALES - GENERAL: PAINLEVEL_OUTOF10: 0 - NO PAIN

## 2024-03-28 NOTE — PROGRESS NOTES
"Physical Therapy Treatment    Patient Name: Hernesto Funes  MRN: [id number]  Today's Date: 3/28/2024                 Subjective  Patient reports 100% compliance with HEP and expressed good understanding. Patient states that he  had increased pain in lateral ankle after last session. States that he had increased swelling for several days after session.           Precautions  Precautions  Braces Applied: low leg walkng boot from Dr Degroot Comment: none  Pain  Pain Assessment: 0-10  Pain Location: Ankle  Pain Orientation: Left    Assessment: Patient identified by name and date of birth. Heel Clocks performed in small range of motion, increased pain with fwd motion. Patient challenged with SLS with R LE swings. Decreased compliant surface from large airex to small blue mat for SLS, as large blue mat caused increased pain that lasted after last session.          Plan: Plan to continue with L ankle strengthening and balance training to allow for improved ambulation on uneven surfaces. JW    OP PT Plan  Treatment/Interventions: Aquatic therapy, Cryotherapy, Education/ Instruction, Electrical stimulation, Gait training, Hot pack, Manual therapy, Neuromuscular re-education, Self care/ home management, Therapeutic exercises (IASTM/cupping)  PT Plan: Skilled PT  PT Frequency: 2 times per week  Duration: 6wks  Onset Date: 01/23/24  Rehab Potential: Good  Plan of Care Agreement: Patient    Current Problem  1. Dislocation of ankle, left, closed, subsequent encounter  Follow Up In Physical Therapy          Reason for Referral: S/P closed ankle disloc  Referred By: Chris  General Comment: 5/13    Treatments:    TE: 40'  Upright Bike 6' Slant board  Slant board 2 x 1'   SLS 3 x 30\" small blue mat  Slant board x 2 for 1 min. Ea.  Standing squats x 20 on shuttle   Standing heel/toe raises off step x 20   SLS with R LE semi [place] x20 N  Heel Clocks standing on L 2x10   Step ups 18\"   Shuttle leg press 75# b/l, 25# L 2x10 " "  Lunges on Bosu 2x10 Alternating L   Standing small balance board x2  1' ea   BAPS L2 DF/PF/IN/EV x 10 reps each              -CW/CCW x5 reps each   DF/PF/IN/EV  with /Blue Tband x 20 ea. Dir. (P)      Alphabet writing 1 cycle HEP     Manual: 5'   STW to left ankle/calf region while working ROM.  5 min.        OP EDUCATION:   Individual(s) Educated: Patient  Patient/Caregiver Demonstrated Understanding: yes  Plan of Care Discussed and Agreed Upon: yesAccess Code: [email or web address]  URL: [email or web address]  Date: 03/07/2024  Prepared by: Jenaro Rand     Exercises  - Supine Single Leg Ankle Pumps (Mirrored)   - Supine Ankle Inversion Eversion AROM (Mirrored)   - Seated Calf Stretch with Strap (Mirrored)   - Long Sitting Ankle Dorsiflexion with Anchored Resistance (Mirrored)   - Standing Heel Raise with Support  - 1 x daily - 7 x weekly - 3 sets - 10 reps  - Standing Toe Raises at Chair  - 1 x daily - 7 x weekly - 3 sets - 10 reps  - Mini Squat  - 1 x daily - 7 x weekly - 3 sets - 10 reps  - Single Leg Stance with Support  - 1 x daily - 7 x weekly - 3 reps - 30 hold  - Step Up  - 1 x daily - 7 x weekly - 3 sets - 10 reps    Goals:  Active       PT Problem       PT Goal 1       Start:  03/07/24    Expected End:  06/05/24       1. Independent HEP to allow for 50% reduction in max ADL C/C sx ( 5/10) 2-3wk  2. 0/10 night time sx to allow for uninterrupted sleep x 1wk ( 7/7) 2-3wks  3. Survey score improvement from 38/80 to 45/80 (LEFS) 4-6wks  4. ROM increase to allow for improved ADL gait (from neut-10 active DF) 4-6wks  5. Strength increase to allow for improved ADL stairs (from 4-/5 to 4+/5 L ankle) 4-6wks         PT Goal 2       Start:  03/07/24    Expected End:  06/05/24       1.\"I'm looking to get back to work\".            "

## 2024-04-01 ENCOUNTER — TREATMENT (OUTPATIENT)
Dept: PHYSICAL THERAPY | Facility: CLINIC | Age: 44
End: 2024-04-01
Payer: COMMERCIAL

## 2024-04-01 DIAGNOSIS — S93.05XD: ICD-10-CM

## 2024-04-01 PROCEDURE — 97110 THERAPEUTIC EXERCISES: CPT | Mod: GP,CQ

## 2024-04-01 ASSESSMENT — PAIN SCALES - GENERAL: PAINLEVEL_OUTOF10: 1

## 2024-04-01 ASSESSMENT — PAIN - FUNCTIONAL ASSESSMENT: PAIN_FUNCTIONAL_ASSESSMENT: 0-10

## 2024-04-01 NOTE — PROGRESS NOTES
"Physical Therapy Treatment    Patient Name: Hernesto Funes  MRN: [id number]  Today's Date: 4/1/2024  Time Calculation  Start Time: 1045  Stop Time: 1128  Time Calculation (min): 43 min  PT Therapeutic Procedures Time Entry  Therapeutic Exercise Time Entry: 40         Current Problem  Problem List Items Addressed This Visit             ICD-10-CM    Dislocation of ankle, left, closed, subsequent encounter S93.05XD       Subjective   Pt.'s name and birthday confirmed.  Pt. Is compliant with HEP.  Pt. Reports minimal sx.'s with ankle today.  No reports of falls.    Reason for Referral: S/P closed ankle disloc  Referred By: Chris  General Comment: 6/13      Precautions  Precautions  Braces Applied: low leg walkng boot from   Precautions Comment: none      Pain  Pain Assessment: 0-10  Pain Score: 1      Treatments:   40'  Upright Bike 6' Slant board  Slant board 2 x 1'   SLS 3 x 30\" small blue mat  Slant board x 2 for 1 min. Ea.  Standing squats x 20 on shuttle   Standing heel/toe raises off step x 20   SLS with R LE semi [place] x20   Heel Clocks standing on L 2x10   Step ups 18\" 2 x10  Shuttle leg press 75# b/l, 25# L 2x10   Lunges on Bosu 2x10 Alternating L   Standing small balance board x2  1' ea   BAPS L2 DF/PF/IN/EV x 10 reps each              -CW/CCW x5 reps each   DF/PF/IN/EV  with /Blue Tband x 20 ea. Dir. (P)      Alphabet writing 1 cycle HEP     Manual: 5'   STW to left ankle/calf region while working ROM.  5 min.               Assessment:  Pt. Demo good control with squats while on the shuttle.  Pt. Continues to feel challenged with SLS with Airex and with small balance board.  Palpable tenderness noted with STM to affected area.       Plan:  Continue with strengthening, ROM and flexibility per tolerance to improve daily activities with little to no difficulty.  MB     OP PT Plan  Treatment/Interventions: Aquatic therapy, Cryotherapy, Education/ Instruction, Electrical stimulation, Gait training, Hot pack, " "Manual therapy, Neuromuscular re-education, Self care/ home management, Therapeutic exercises (IASTM/cupping)  PT Plan: Skilled PT  PT Frequency: 2 times per week  Duration: 6wks  Onset Date: 01/23/24  Rehab Potential: Good  Plan of Care Agreement: Patient        OP EDUCATION:  Individual(s) Educated: Patient  Patient/Caregiver Demonstrated Understanding: yes  Plan of Care Discussed and Agreed Upon: yesAccess Code: [email or web address]  URL: [email or web address]  Date: 03/07/2024  Prepared by: Jenaro Rand     Exercises  - Supine Single Leg Ankle Pumps (Mirrored)   - Supine Ankle Inversion Eversion AROM (Mirrored)   - Seated Calf Stretch with Strap (Mirrored)   - Long Sitting Ankle Dorsiflexion with Anchored Resistance (Mirrored)   - Standing Heel Raise with Support  - 1 x daily - 7 x weekly - 3 sets - 10 reps  - Standing Toe Raises at Chair  - 1 x daily - 7 x weekly - 3 sets - 10 reps  - Mini Squat  - 1 x daily - 7 x weekly - 3 sets - 10 reps  - Single Leg Stance with Support  - 1 x daily - 7 x weekly - 3 reps - 30 hold  - Step Up  - 1 x daily - 7 x weekly - 3 sets - 10 reps          Goals:  Active       PT Problem       PT Goal 1       Start:  03/07/24    Expected End:  06/05/24       1. Independent HEP to allow for 50% reduction in max ADL C/C sx ( 5/10) 2-3wk  2. 0/10 night time sx to allow for uninterrupted sleep x 1wk ( 7/7) 2-3wks  3. Survey score improvement from 38/80 to 45/80 (LEFS) 4-6wks  4. ROM increase to allow for improved ADL gait (from neut-10 active DF) 4-6wks  5. Strength increase to allow for improved ADL stairs (from 4-/5 to 4+/5 L ankle) 4-6wks         PT Goal 2       Start:  03/07/24    Expected End:  06/05/24       1.\"I'm looking to get back to work\".            "

## 2024-04-04 ENCOUNTER — APPOINTMENT (OUTPATIENT)
Dept: PHYSICAL THERAPY | Facility: CLINIC | Age: 44
End: 2024-04-04
Payer: COMMERCIAL

## 2024-04-05 ENCOUNTER — TREATMENT (OUTPATIENT)
Dept: PHYSICAL THERAPY | Facility: CLINIC | Age: 44
End: 2024-04-05
Payer: COMMERCIAL

## 2024-04-05 DIAGNOSIS — S93.05XD: ICD-10-CM

## 2024-04-05 PROCEDURE — 97140 MANUAL THERAPY 1/> REGIONS: CPT | Mod: GP,CQ

## 2024-04-05 PROCEDURE — 97110 THERAPEUTIC EXERCISES: CPT | Mod: GP,CQ

## 2024-04-05 ASSESSMENT — PAIN SCALES - GENERAL: PAINLEVEL_OUTOF10: 1

## 2024-04-05 ASSESSMENT — PAIN - FUNCTIONAL ASSESSMENT: PAIN_FUNCTIONAL_ASSESSMENT: 0-10

## 2024-04-05 NOTE — PROGRESS NOTES
"Physical Therapy Treatment    Patient Name: Hernesto Funes  MRN: [id number]  Today's Date: 4/5/2024  Time Calculation  Start Time: 1402  Stop Time: 1446  Time Calculation (min): 44 min  PT Therapeutic Procedures Time Entry  Manual Therapy Time Entry: 15  Therapeutic Exercise Time Entry: 27       Assessment:   Patient appropriately challenged. Patient demo's difficulty with L ankle dorsiflexion and restriction noted at end range. Patient responds well to manual therapy and IASTM introduced with reduction in myofascial restriction noted. Patient demo's no change pre and post treatment.     Plan:  Continue to work on improving strength and ROM to be able to get in and out of work equipment with little to no difficulty. -AB.     OP PT Plan  Treatment/Interventions: Aquatic therapy, Cryotherapy, Education/ Instruction, Electrical stimulation, Gait training, Hot pack, Manual therapy, Neuromuscular re-education, Self care/ home management, Therapeutic exercises (IASTM/cupping)  PT Plan: Skilled PT  PT Frequency: 2 times per week  Duration: 6wks  Onset Date: 01/23/24  Rehab Potential: Good  Plan of Care Agreement: Patient    Current Problem  Problem List Items Addressed This Visit             ICD-10-CM    Dislocation of ankle, left, closed, subsequent encounter S93.05XD       Subjective   General  Reason for Referral: S/P closed ankle disloc  Referred By: Chris  General Comment: 7/13  Patient states he's not really having pain. States that uneven terrain is still difficult for him to walk up, or inclined walking.    Precautions  Precautions  Precautions Comment: none    Pain  Pain Assessment: 0-10  Pain Score: 1    Objective     Treatments:  Therapeutic Exercise: 27 minutes, 2 units  Upright Bike 6' lvl 10 (P, resistance)   Slant board 2 x 1'   SLS 3 x 30\" on airex (P, surface)   Slant board x 2 for 1 min. Ea.  Standing squats x 20 on shuttle   Standing heel/toe raises off step x 20   Step ups 18\" 2 x10  Shuttle leg press " "100# b/l, 50# L 2x10 (P, weight)   SLS with R LE semi [place] x20     Not today: 4-5-24:   Heel Clocks standing on L 2x10   Lunges on Bosu 2x10 Alternating L   Standing small balance board x2  1' ea   BAPS L2 DF/PF/IN/EV x 10 reps each              -CW/CCW x5 reps each   DF/PF/IN/EV  with /Blue Tband x 20 ea. Dir. (P)      Alphabet writing 1 cycle HEP     Manual: 15 minutes, 1 unit  STW to left ankle/calf region while working ROM         OP EDUCATION:   Individual(s) Educated: Patient  Patient/Caregiver Demonstrated Understanding: yes  Plan of Care Discussed and Agreed Upon: yesAccess Code: [email or web address]  URL: [email or web address]  Date: 03/07/2024  Prepared by: Jenaro Rand     Exercises  - Supine Single Leg Ankle Pumps (Mirrored)   - Supine Ankle Inversion Eversion AROM (Mirrored)   - Seated Calf Stretch with Strap (Mirrored)   - Long Sitting Ankle Dorsiflexion with Anchored Resistance (Mirrored)   - Standing Heel Raise with Support  - 1 x daily - 7 x weekly - 3 sets - 10 reps  - Standing Toe Raises at Chair  - 1 x daily - 7 x weekly - 3 sets - 10 reps  - Mini Squat  - 1 x daily - 7 x weekly - 3 sets - 10 reps  - Single Leg Stance with Support  - 1 x daily - 7 x weekly - 3 reps - 30 hold  - Step Up  - 1 x daily - 7 x weekly - 3 sets - 10 reps    Goals:  Active       PT Problem       PT Goal 1       Start:  03/07/24    Expected End:  06/05/24       1. Independent HEP to allow for 50% reduction in max ADL C/C sx ( 5/10) 2-3wk  2. 0/10 night time sx to allow for uninterrupted sleep x 1wk ( 7/7) 2-3wks  3. Survey score improvement from 38/80 to 45/80 (LEFS) 4-6wks  4. ROM increase to allow for improved ADL gait (from neut-10 active DF) 4-6wks  5. Strength increase to allow for improved ADL stairs (from 4-/5 to 4+/5 L ankle) 4-6wks         PT Goal 2       Start:  03/07/24    Expected End:  06/05/24       1.\"I'm looking to get back to work\".            "

## 2024-04-08 ENCOUNTER — TREATMENT (OUTPATIENT)
Dept: PHYSICAL THERAPY | Facility: CLINIC | Age: 44
End: 2024-04-08
Payer: COMMERCIAL

## 2024-04-08 DIAGNOSIS — S93.05XD: ICD-10-CM

## 2024-04-08 PROCEDURE — 97140 MANUAL THERAPY 1/> REGIONS: CPT | Mod: GP,CQ

## 2024-04-08 PROCEDURE — 97110 THERAPEUTIC EXERCISES: CPT | Mod: GP,CQ

## 2024-04-08 ASSESSMENT — PAIN SCALES - GENERAL: PAINLEVEL_OUTOF10: 0 - NO PAIN

## 2024-04-08 ASSESSMENT — PAIN - FUNCTIONAL ASSESSMENT: PAIN_FUNCTIONAL_ASSESSMENT: 0-10

## 2024-04-08 NOTE — PROGRESS NOTES
"Physical Therapy Treatment    Patient Name: Hernesto Funes  MRN: [id number]  Today's Date: 4/8/2024  Time Calculation  Start Time: 0959  Stop Time: 1043  Time Calculation (min): 44 min  PT Therapeutic Procedures Time Entry  Manual Therapy Time Entry: 10  Therapeutic Exercise Time Entry: 30         Current Problem  Problem List Items Addressed This Visit             ICD-10-CM    Dislocation of ankle, left, closed, subsequent encounter S93.05XD       Subjective   Pt.'s name and birthday confirmed.  Pt. Is compliant with HEP.  Pt. Has no c/o pain with ankle.  States uneven ground still can be challenging.  Pt. States he push mowed his yard yesterday.      Reason for Referral: S/P closed ankle disloc  Referred By: Chris  General Comment: 8/13      Precautions  Precautions  Precautions Comment: none      Pain  Pain Assessment: 0-10  Pain Score: 0 - No pain  Pain Location: Ankle  Pain Orientation: Left      Treatments: 30 mins  Upright Bike 6' lvl 10   Slant board 2 x 1'   SLS 3 x 30\" on airex      Slant board x 2 for 1 min. Ea.  Standing squats x 20 on shuttle   Standing heel/toe raises off step x 20   Step ups 18\" 2 x10  Shuttle leg press 100# b/l, 50# L 2x10   SLS with R LE semi [place] x20    Heel Clocks standing on L 2x10 (X)  Lunges on Bosu 2x10 Alternating L (X)  Standing small balance board x2  1' ea   BAPS L2 DF/PF/IN/EV x 15 reps each              -CW/CCW x15 reps each   DF/PF/IN/EV  with /Blue Tband x 20 ea. Dir.       Alphabet writing 1 cycle HEP     Manual: 10 minutes, 1 unit  STW to left ankle/calf region while working ROM          Assessment:  Twinge noted on top of foot with squats today.  Pt. Tolerated remaining TE with ease, no c/o increased sx.'s noted.  IASTM to medial side of ankle, tenderness noted.       Plan:  Continue with strengthening, ROM and flexibility per tolerance to improve daily activities/work duties with little to no difficulty.  MB     OP PT Plan  Treatment/Interventions: Aquatic " "therapy, Cryotherapy, Education/ Instruction, Electrical stimulation, Gait training, Hot pack, Manual therapy, Neuromuscular re-education, Self care/ home management, Therapeutic exercises (IASTM/cupping)  PT Plan: Skilled PT  PT Frequency: 2 times per week  Duration: 6wks  Onset Date: 01/23/24  Rehab Potential: Good  Plan of Care Agreement: Patient        OP EDUCATION:    Individual(s) Educated: Patient  Patient/Caregiver Demonstrated Understanding: yes  Plan of Care Discussed and Agreed Upon: yesAccess Code: [email or web address]  URL: [email or web address]  Date: 03/07/2024  Prepared by: Jenaro Rand     Exercises  - Supine Single Leg Ankle Pumps (Mirrored)   - Supine Ankle Inversion Eversion AROM (Mirrored)   - Seated Calf Stretch with Strap (Mirrored)   - Long Sitting Ankle Dorsiflexion with Anchored Resistance (Mirrored)   - Standing Heel Raise with Support  - 1 x daily - 7 x weekly - 3 sets - 10 reps  - Standing Toe Raises at Chair  - 1 x daily - 7 x weekly - 3 sets - 10 reps  - Mini Squat  - 1 x daily - 7 x weekly - 3 sets - 10 reps  - Single Leg Stance with Support  - 1 x daily - 7 x weekly - 3 reps - 30 hold  - Step Up  - 1 x daily - 7 x weekly - 3 sets - 10 reps      Goals:  Active       PT Problem       PT Goal 1       Start:  03/07/24    Expected End:  06/05/24       1. Independent HEP to allow for 50% reduction in max ADL C/C sx ( 5/10) 2-3wk  2. 0/10 night time sx to allow for uninterrupted sleep x 1wk ( 7/7) 2-3wks  3. Survey score improvement from 38/80 to 45/80 (LEFS) 4-6wks  4. ROM increase to allow for improved ADL gait (from neut-10 active DF) 4-6wks  5. Strength increase to allow for improved ADL stairs (from 4-/5 to 4+/5 L ankle) 4-6wks         PT Goal 2       Start:  03/07/24    Expected End:  06/05/24       1.\"I'm looking to get back to work\".            "

## 2024-04-11 ENCOUNTER — APPOINTMENT (OUTPATIENT)
Dept: PHYSICAL THERAPY | Facility: CLINIC | Age: 44
End: 2024-04-11
Payer: COMMERCIAL

## 2024-04-15 ENCOUNTER — APPOINTMENT (OUTPATIENT)
Dept: PHYSICAL THERAPY | Facility: CLINIC | Age: 44
End: 2024-04-15
Payer: COMMERCIAL

## 2024-04-18 ENCOUNTER — APPOINTMENT (OUTPATIENT)
Dept: PHYSICAL THERAPY | Facility: CLINIC | Age: 44
End: 2024-04-18
Payer: COMMERCIAL

## 2024-04-19 ENCOUNTER — TREATMENT (OUTPATIENT)
Dept: PHYSICAL THERAPY | Facility: CLINIC | Age: 44
End: 2024-04-19
Payer: COMMERCIAL

## 2024-04-19 DIAGNOSIS — S93.05XD: ICD-10-CM

## 2024-04-19 PROCEDURE — 97110 THERAPEUTIC EXERCISES: CPT | Mod: GP

## 2024-04-19 ASSESSMENT — PAIN SCALES - GENERAL: PAINLEVEL_OUTOF10: 0 - NO PAIN

## 2024-04-19 ASSESSMENT — PAIN - FUNCTIONAL ASSESSMENT: PAIN_FUNCTIONAL_ASSESSMENT: 0-10

## 2024-04-19 NOTE — PROGRESS NOTES
Physical Therapy Treatment    Patient Name: Hernesto Funes  MRN: [id number]  Today's Date: 2024  Time Calculation  Start Time: 1553  Stop Time: 1617  Time Calculation (min): 24 min      PT Therapeutic Procedures Time Entry  Therapeutic Exercise Time Entry: 10                         General:  General  Reason for Referral: S/P closed ankle disloc  Referred By: Chris  General Comment:       Current Problem  Problem List Items Addressed This Visit             ICD-10-CM    Dislocation of ankle, left, closed, subsequent encounter S93.05XD         Assessment:Patient identity confirmed today with name/. ;  ( 0 ) falls since last clinic visit; see goals-excellent sx reduction and strength gains      Plan:  This patient agrees to discharge to ongoing HEP and home management with ( good-excellent ) progress achieved.       Subjective: I have   0/10 pain right now.   I am back to work now.      Precautions  Precautions  Precautions Comment: none      Pain  Pain Assessment: 0-10  Pain Score: 0 - No pain  Pain Location: Ankle  Pain Orientation: Left          Treatments:  Reviewed ongoing HEP today.   Reassess  Toe walk review  Wall slide ROM review  NOT TODAY       OP EDUCATION:  Access Code: [email or web address]  URL: [email or web address]  Date: 2024  Prepared by: Jenaro Rand    Exercises  - Seated Calf Stretch with Strap (Mirrored)   - Standing Ankle Dorsiflexion Stretch on Chair   - Toe Walking   - Single Leg Heel Raise (Mirrored)     Goals:  Active       PT Problem       PT Goal 1       Start:  24    Expected End:  24       1. Independent HEP to allow for 50% reduction in max ADL C/C sx ( 5/10) 2-3wk 1/10 max sx within the last 5 days; 24; MET  2. 0/10 night time sx to allow for uninterrupted sleep x 1wk ( ) 2-3wks 0/7 now; 24; MET  3. Survey score improvement from 38/80 to 45/80 (LEFS) 4-6wks 80/80 as of 24; MET  4. ROM increase to allow for improved ADL gait  "(from neut-10 active DF) 4-6wks full ADL gait; 7 active DF today; 4/19/24; PARTIALLY MET   5. Strength increase to allow for improved ADL stairs (from 4-/5 to 4+/5 L ankle) 4-6wks full ADL stairs; 4+/5 L gross strength; 4/19/24; MET         PT Goal 2       Start:  03/07/24    Expected End:  06/05/24       1.\"I'm looking to get back to work\".\"I am back to work now\". 4/19/24; MET            "

## 2024-04-24 ENCOUNTER — APPOINTMENT (OUTPATIENT)
Dept: PHYSICAL THERAPY | Facility: CLINIC | Age: 44
End: 2024-04-24
Payer: COMMERCIAL

## 2024-04-26 ENCOUNTER — APPOINTMENT (OUTPATIENT)
Dept: PHYSICAL THERAPY | Facility: CLINIC | Age: 44
End: 2024-04-26
Payer: COMMERCIAL

## 2024-12-30 ENCOUNTER — OFFICE VISIT (OUTPATIENT)
Dept: URGENT CARE | Facility: CLINIC | Age: 44
End: 2024-12-30
Payer: COMMERCIAL

## 2024-12-30 VITALS
HEIGHT: 73 IN | BODY MASS INDEX: 25.18 KG/M2 | DIASTOLIC BLOOD PRESSURE: 83 MMHG | TEMPERATURE: 98.6 F | WEIGHT: 190 LBS | HEART RATE: 77 BPM | SYSTOLIC BLOOD PRESSURE: 126 MMHG | OXYGEN SATURATION: 96 % | RESPIRATION RATE: 16 BRPM

## 2024-12-30 DIAGNOSIS — M77.8 TENDONITIS OF ELBOW, RIGHT: Primary | ICD-10-CM

## 2024-12-30 PROCEDURE — 99213 OFFICE O/P EST LOW 20 MIN: CPT | Performed by: NURSE PRACTITIONER

## 2024-12-30 RX ORDER — PREDNISONE 10 MG/1
TABLET ORAL
Qty: 30 TABLET | Refills: 0 | Status: SHIPPED | OUTPATIENT
Start: 2024-12-30

## 2025-02-07 ENCOUNTER — OFFICE VISIT (OUTPATIENT)
Age: 45
End: 2025-02-07
Payer: COMMERCIAL

## 2025-02-07 VITALS
BODY MASS INDEX: 26.77 KG/M2 | DIASTOLIC BLOOD PRESSURE: 70 MMHG | HEIGHT: 73 IN | SYSTOLIC BLOOD PRESSURE: 112 MMHG | HEART RATE: 79 BPM | OXYGEN SATURATION: 97 % | WEIGHT: 202 LBS

## 2025-02-07 DIAGNOSIS — M79.601 RIGHT ARM PAIN: Primary | ICD-10-CM

## 2025-02-07 PROBLEM — S93.05XD: Status: RESOLVED | Noted: 2024-03-07 | Resolved: 2025-02-07

## 2025-02-07 PROCEDURE — 1036F TOBACCO NON-USER: CPT | Performed by: FAMILY MEDICINE

## 2025-02-07 PROCEDURE — 99213 OFFICE O/P EST LOW 20 MIN: CPT | Performed by: FAMILY MEDICINE

## 2025-02-07 PROCEDURE — 3008F BODY MASS INDEX DOCD: CPT | Performed by: FAMILY MEDICINE

## 2025-02-07 RX ORDER — PREDNISONE 10 MG/1
TABLET ORAL
Qty: 60 TABLET | Refills: 0 | Status: SHIPPED | OUTPATIENT
Start: 2025-02-07 | End: 2025-03-09

## 2025-02-07 ASSESSMENT — PATIENT HEALTH QUESTIONNAIRE - PHQ9
2. FEELING DOWN, DEPRESSED OR HOPELESS: NOT AT ALL
1. LITTLE INTEREST OR PLEASURE IN DOING THINGS: NOT AT ALL
SUM OF ALL RESPONSES TO PHQ9 QUESTIONS 1 AND 2: 0

## 2025-02-07 ASSESSMENT — ENCOUNTER SYMPTOMS
MYALGIAS: 1
JOINT SWELLING: 0
NECK PAIN: 0
FATIGUE: 0
NECK STIFFNESS: 0
WEAKNESS: 0
BACK PAIN: 0
NUMBNESS: 0

## 2025-02-07 NOTE — PROGRESS NOTES
"Subjective   Patient ID: Hernesto Funes is a 44 y.o. male who presents for Arm Pain (Right arm pain, tried a round of steroids from urgent care, little relief ).    HPI   No known injury he has been working out for the last 7 months.  Started is more tendinitis in the right antecubital area started to go up to the right bicep.  After about a week or 2 he started to have some troubles with the trapezius muscle on the right.  Had troubles with neck pain before but that has not gotten worse and not really bothering him now.  The last week even with resting from doing the lifting sometimes he would also get right wrist pain to the point flight felt like his wrist was coming out of joint  No obvious weakness or numbness in the right arm or hand.    On exam he has no tenderness to palpation over the C-spine, mild tenderness to palpation to the right trapezius muscle.  Negative impingement of the right shoulder no tenderness to palpation over the AC joint or right bicep area.  Negative impingement.  Mild tenderness to palpation to the bottom part of the right bicep in the antecubital area.  No tenderness over the lateral or medial epicondyle.  No tenderness to palpation to any of the bones of the right wrist.  Negative Tinel's and Phalen's. Good  strength bilateral.    Did try for 3-1 taper of steroids and it helped while was on it but the pain came back to about where it was.  ?  Just muscles.  Try Norflex?    Continue relative rest  Try a longer slower taper for the next 30 days with steroids.  He is not any better he will call in 3 to 4 weeks consider referral to orthopedics  Review of Systems   Constitutional:  Negative for fatigue.   Musculoskeletal:  Positive for myalgias. Negative for back pain, joint swelling, neck pain and neck stiffness.   Skin:  Negative for rash.   Neurological:  Negative for weakness and numbness.       Objective   /70   Pulse 79   Ht 1.854 m (6' 1\")   Wt 91.6 kg (202 lb)   SpO2 " 97%   BMI 26.65 kg/m²     Physical Exam    Assessment/Plan   Problem List Items Addressed This Visit    None  Visit Diagnoses         Codes    Right arm pain    -  Primary M79.601    Relevant Medications    predniSONE (Deltasone) 10 mg tablet